# Patient Record
Sex: FEMALE | Race: BLACK OR AFRICAN AMERICAN
[De-identification: names, ages, dates, MRNs, and addresses within clinical notes are randomized per-mention and may not be internally consistent; named-entity substitution may affect disease eponyms.]

---

## 2018-03-06 ENCOUNTER — HOSPITAL ENCOUNTER (INPATIENT)
Dept: HOSPITAL 72 - EMR | Age: 68
LOS: 2 days | Discharge: HOME | DRG: 191 | End: 2018-03-08
Payer: OTHER GOVERNMENT

## 2018-03-06 VITALS — SYSTOLIC BLOOD PRESSURE: 167 MMHG | DIASTOLIC BLOOD PRESSURE: 79 MMHG

## 2018-03-06 VITALS — WEIGHT: 160 LBS | BODY MASS INDEX: 26.66 KG/M2 | HEIGHT: 65 IN

## 2018-03-06 VITALS — SYSTOLIC BLOOD PRESSURE: 175 MMHG | DIASTOLIC BLOOD PRESSURE: 79 MMHG

## 2018-03-06 VITALS — SYSTOLIC BLOOD PRESSURE: 164 MMHG | DIASTOLIC BLOOD PRESSURE: 97 MMHG

## 2018-03-06 VITALS — DIASTOLIC BLOOD PRESSURE: 79 MMHG | SYSTOLIC BLOOD PRESSURE: 149 MMHG

## 2018-03-06 DIAGNOSIS — R00.0: ICD-10-CM

## 2018-03-06 DIAGNOSIS — J44.1: Primary | ICD-10-CM

## 2018-03-06 DIAGNOSIS — E46: ICD-10-CM

## 2018-03-06 DIAGNOSIS — Z79.84: ICD-10-CM

## 2018-03-06 DIAGNOSIS — E11.9: ICD-10-CM

## 2018-03-06 DIAGNOSIS — I11.9: ICD-10-CM

## 2018-03-06 DIAGNOSIS — Z72.0: ICD-10-CM

## 2018-03-06 DIAGNOSIS — I10: ICD-10-CM

## 2018-03-06 DIAGNOSIS — I49.3: ICD-10-CM

## 2018-03-06 DIAGNOSIS — R79.89: ICD-10-CM

## 2018-03-06 LAB
ADD MANUAL DIFF: NO
ALBUMIN SERPL-MCNC: 3.3 G/DL (ref 3.4–5)
ALBUMIN/GLOB SERPL: 0.8 {RATIO} (ref 1–2.7)
ALP SERPL-CCNC: 138 U/L (ref 46–116)
ALT SERPL-CCNC: 34 U/L (ref 12–78)
ANION GAP SERPL CALC-SCNC: 10 MMOL/L (ref 5–15)
AST SERPL-CCNC: 30 U/L (ref 15–37)
BASOPHILS NFR BLD AUTO: 1 % (ref 0–2)
BILIRUB SERPL-MCNC: 0.2 MG/DL (ref 0.2–1)
BUN SERPL-MCNC: 19 MG/DL (ref 7–18)
CALCIUM SERPL-MCNC: 9.3 MG/DL (ref 8.5–10.1)
CHLORIDE SERPL-SCNC: 104 MMOL/L (ref 98–107)
CK MB SERPL-MCNC: 10.3 NG/ML (ref 0–3.6)
CK SERPL-CCNC: 427 U/L (ref 26–308)
CO2 SERPL-SCNC: 27 MMOL/L (ref 21–32)
CREAT SERPL-MCNC: 1.1 MG/DL (ref 0.55–1.3)
EOSINOPHIL NFR BLD AUTO: 11.5 % (ref 0–3)
ERYTHROCYTE [DISTWIDTH] IN BLOOD BY AUTOMATED COUNT: 14.2 % (ref 11.6–14.8)
GLOBULIN SER-MCNC: 4.2 G/DL
HCT VFR BLD CALC: 43.6 % (ref 37–47)
HGB BLD-MCNC: 14.4 G/DL (ref 12–16)
LYMPHOCYTES NFR BLD AUTO: 17.6 % (ref 20–45)
MCV RBC AUTO: 88 FL (ref 80–99)
MONOCYTES NFR BLD AUTO: 5.6 % (ref 1–10)
NEUTROPHILS NFR BLD AUTO: 64.4 % (ref 45–75)
PLATELET # BLD: 268 K/UL (ref 150–450)
POTASSIUM SERPL-SCNC: 4 MMOL/L (ref 3.5–5.1)
RBC # BLD AUTO: 4.93 M/UL (ref 4.2–5.4)
SODIUM SERPL-SCNC: 141 MMOL/L (ref 136–145)
WBC # BLD AUTO: 9.7 K/UL (ref 4.8–10.8)

## 2018-03-06 PROCEDURE — 93005 ELECTROCARDIOGRAM TRACING: CPT

## 2018-03-06 PROCEDURE — 80053 COMPREHEN METABOLIC PANEL: CPT

## 2018-03-06 PROCEDURE — 85730 THROMBOPLASTIN TIME PARTIAL: CPT

## 2018-03-06 PROCEDURE — 90732 PPSV23 VACC 2 YRS+ SUBQ/IM: CPT

## 2018-03-06 PROCEDURE — 36415 COLL VENOUS BLD VENIPUNCTURE: CPT

## 2018-03-06 PROCEDURE — 82962 GLUCOSE BLOOD TEST: CPT

## 2018-03-06 PROCEDURE — 83880 ASSAY OF NATRIURETIC PEPTIDE: CPT

## 2018-03-06 PROCEDURE — 82550 ASSAY OF CK (CPK): CPT

## 2018-03-06 PROCEDURE — 94640 AIRWAY INHALATION TREATMENT: CPT

## 2018-03-06 PROCEDURE — 93306 TTE W/DOPPLER COMPLETE: CPT

## 2018-03-06 PROCEDURE — 83735 ASSAY OF MAGNESIUM: CPT

## 2018-03-06 PROCEDURE — 87040 BLOOD CULTURE FOR BACTERIA: CPT

## 2018-03-06 PROCEDURE — 71045 X-RAY EXAM CHEST 1 VIEW: CPT

## 2018-03-06 PROCEDURE — 83690 ASSAY OF LIPASE: CPT

## 2018-03-06 PROCEDURE — 80048 BASIC METABOLIC PNL TOTAL CA: CPT

## 2018-03-06 PROCEDURE — 85025 COMPLETE CBC W/AUTO DIFF WBC: CPT

## 2018-03-06 PROCEDURE — 84484 ASSAY OF TROPONIN QUANT: CPT

## 2018-03-06 PROCEDURE — 99291 CRITICAL CARE FIRST HOUR: CPT

## 2018-03-06 PROCEDURE — 82553 CREATINE MB FRACTION: CPT

## 2018-03-06 RX ADMIN — Medication SCH MCG: at 15:03

## 2018-03-06 RX ADMIN — DOCUSATE SODIUM SCH MG: 100 CAPSULE, LIQUID FILLED ORAL at 20:30

## 2018-03-06 RX ADMIN — SODIUM CHLORIDE AND POTASSIUM CHLORIDE SCH MLS/HR: 9; 2.98 INJECTION, SOLUTION INTRAVENOUS at 23:28

## 2018-03-06 RX ADMIN — SODIUM CHLORIDE AND POTASSIUM CHLORIDE SCH MLS/HR: 9; 2.98 INJECTION, SOLUTION INTRAVENOUS at 13:56

## 2018-03-06 RX ADMIN — Medication SCH MCG: at 11:00

## 2018-03-06 RX ADMIN — Medication SCH MCG: at 23:41

## 2018-03-06 RX ADMIN — Medication SCH MCG: at 19:47

## 2018-03-06 RX ADMIN — HEPARIN SODIUM SCH UNITS: 5000 INJECTION INTRAVENOUS; SUBCUTANEOUS at 20:33

## 2018-03-06 NOTE — DIAGNOSTIC IMAGING REPORT
Indication: Dyspnea

 

Comparison:  None

 

A single view chest radiograph was obtained.

 

Findings:

 

No definite infiltrate or pulmonary vascular congestion identified.  The heart is

enlarged. The aorta is mildly enlarged consistent with atherosclerotic vascular

disease.  The bones are osteopenic.

 

Impression:

 

No acute disease

## 2018-03-06 NOTE — HISTORY & PHYSICAL
History and Physical


History & Physicial


ID # [id number]











Howie Clayton Mar 6, 2018 20:19

## 2018-03-06 NOTE — CARDIOLOGY REPORT
--------------- APPROVED REPORT --------------





EKG Measurement

Heart [id number]

ME [id number]

[id number]

[id number]

[id number]





Sinus rhythm with occasional premature ventricular complexes

Possible Left atrial enlargement

Left anterior fascicular block

Nonspecific ST abnormality

Abnormal ECG

## 2018-03-07 VITALS — DIASTOLIC BLOOD PRESSURE: 132 MMHG | SYSTOLIC BLOOD PRESSURE: 161 MMHG

## 2018-03-07 VITALS — SYSTOLIC BLOOD PRESSURE: 171 MMHG | DIASTOLIC BLOOD PRESSURE: 103 MMHG

## 2018-03-07 VITALS — DIASTOLIC BLOOD PRESSURE: 79 MMHG | SYSTOLIC BLOOD PRESSURE: 155 MMHG

## 2018-03-07 VITALS — SYSTOLIC BLOOD PRESSURE: 184 MMHG | DIASTOLIC BLOOD PRESSURE: 85 MMHG

## 2018-03-07 VITALS — DIASTOLIC BLOOD PRESSURE: 91 MMHG | SYSTOLIC BLOOD PRESSURE: 177 MMHG

## 2018-03-07 VITALS — DIASTOLIC BLOOD PRESSURE: 94 MMHG | SYSTOLIC BLOOD PRESSURE: 175 MMHG

## 2018-03-07 VITALS — SYSTOLIC BLOOD PRESSURE: 160 MMHG | DIASTOLIC BLOOD PRESSURE: 98 MMHG

## 2018-03-07 VITALS — DIASTOLIC BLOOD PRESSURE: 104 MMHG | SYSTOLIC BLOOD PRESSURE: 159 MMHG

## 2018-03-07 VITALS — DIASTOLIC BLOOD PRESSURE: 95 MMHG | SYSTOLIC BLOOD PRESSURE: 160 MMHG

## 2018-03-07 LAB
ADD MANUAL DIFF: NO
ANION GAP SERPL CALC-SCNC: 7 MMOL/L (ref 5–15)
BASOPHILS NFR BLD AUTO: 0.9 % (ref 0–2)
BUN SERPL-MCNC: 21 MG/DL (ref 7–18)
CALCIUM SERPL-MCNC: 9.2 MG/DL (ref 8.5–10.1)
CHLORIDE SERPL-SCNC: 105 MMOL/L (ref 98–107)
CO2 SERPL-SCNC: 27 MMOL/L (ref 21–32)
CREAT SERPL-MCNC: 1.1 MG/DL (ref 0.55–1.3)
EOSINOPHIL NFR BLD AUTO: 1.1 % (ref 0–3)
ERYTHROCYTE [DISTWIDTH] IN BLOOD BY AUTOMATED COUNT: 14.3 % (ref 11.6–14.8)
HCT VFR BLD CALC: 43.3 % (ref 37–47)
HGB BLD-MCNC: 14.4 G/DL (ref 12–16)
LYMPHOCYTES NFR BLD AUTO: 13.9 % (ref 20–45)
MCV RBC AUTO: 89 FL (ref 80–99)
MONOCYTES NFR BLD AUTO: 7.3 % (ref 1–10)
NEUTROPHILS NFR BLD AUTO: 76.8 % (ref 45–75)
PLATELET # BLD: 292 K/UL (ref 150–450)
POTASSIUM SERPL-SCNC: 4.4 MMOL/L (ref 3.5–5.1)
RBC # BLD AUTO: 4.87 M/UL (ref 4.2–5.4)
SODIUM SERPL-SCNC: 139 MMOL/L (ref 136–145)
WBC # BLD AUTO: 14 K/UL (ref 4.8–10.8)

## 2018-03-07 RX ADMIN — DOCUSATE SODIUM SCH MG: 100 CAPSULE, LIQUID FILLED ORAL at 21:15

## 2018-03-07 RX ADMIN — THEOPHYLLINE ANHYDROUS SCH MG: 100 CAPSULE, EXTENDED RELEASE ORAL at 21:16

## 2018-03-07 RX ADMIN — HYDRALAZINE HYDROCHLORIDE SCH MG: 25 TABLET ORAL at 17:16

## 2018-03-07 RX ADMIN — Medication SCH MCG: at 23:18

## 2018-03-07 RX ADMIN — METHYLPREDNISOLONE SODIUM SUCCINATE SCH MG: 125 INJECTION, POWDER, FOR SOLUTION INTRAMUSCULAR; INTRAVENOUS at 09:26

## 2018-03-07 RX ADMIN — Medication SCH MCG: at 11:05

## 2018-03-07 RX ADMIN — SODIUM CHLORIDE AND POTASSIUM CHLORIDE SCH MLS/HR: 9; 2.98 INJECTION, SOLUTION INTRAVENOUS at 18:39

## 2018-03-07 RX ADMIN — METHYLPREDNISOLONE SODIUM SUCCINATE SCH MG: 125 INJECTION, POWDER, FOR SOLUTION INTRAMUSCULAR; INTRAVENOUS at 17:16

## 2018-03-07 RX ADMIN — Medication SCH MCG: at 08:05

## 2018-03-07 RX ADMIN — LISINOPRIL SCH MG: 20 TABLET ORAL at 09:25

## 2018-03-07 RX ADMIN — HYDRALAZINE HYDROCHLORIDE SCH MG: 25 TABLET ORAL at 12:23

## 2018-03-07 RX ADMIN — METHYLPREDNISOLONE SODIUM SUCCINATE SCH MG: 125 INJECTION, POWDER, FOR SOLUTION INTRAMUSCULAR; INTRAVENOUS at 12:15

## 2018-03-07 RX ADMIN — HYDRALAZINE HYDROCHLORIDE SCH MG: 25 TABLET ORAL at 05:44

## 2018-03-07 RX ADMIN — Medication SCH MCG: at 04:10

## 2018-03-07 RX ADMIN — HEPARIN SODIUM SCH UNITS: 5000 INJECTION INTRAVENOUS; SUBCUTANEOUS at 13:24

## 2018-03-07 RX ADMIN — SODIUM CHLORIDE AND POTASSIUM CHLORIDE SCH MLS/HR: 9; 2.98 INJECTION, SOLUTION INTRAVENOUS at 09:30

## 2018-03-07 RX ADMIN — Medication SCH MCG: at 15:43

## 2018-03-07 RX ADMIN — DOCUSATE SODIUM SCH MG: 100 CAPSULE, LIQUID FILLED ORAL at 09:25

## 2018-03-07 RX ADMIN — Medication SCH MCG: at 19:47

## 2018-03-07 RX ADMIN — HYDRALAZINE HYDROCHLORIDE SCH MG: 25 TABLET ORAL at 23:39

## 2018-03-07 RX ADMIN — THEOPHYLLINE ANHYDROUS SCH MG: 100 CAPSULE, EXTENDED RELEASE ORAL at 09:26

## 2018-03-07 RX ADMIN — HEPARIN SODIUM SCH UNITS: 5000 INJECTION INTRAVENOUS; SUBCUTANEOUS at 05:39

## 2018-03-07 RX ADMIN — METFORMIN HYDROCHLORIDE SCH MG: 500 TABLET ORAL at 05:38

## 2018-03-07 RX ADMIN — METFORMIN HYDROCHLORIDE SCH MG: 500 TABLET ORAL at 17:15

## 2018-03-07 RX ADMIN — METHYLPREDNISOLONE SODIUM SUCCINATE SCH MG: 125 INJECTION, POWDER, FOR SOLUTION INTRAMUSCULAR; INTRAVENOUS at 23:39

## 2018-03-07 RX ADMIN — HEPARIN SODIUM SCH UNITS: 5000 INJECTION INTRAVENOUS; SUBCUTANEOUS at 21:18

## 2018-03-07 RX ADMIN — ASPIRIN 81 MG SCH MG: 81 TABLET ORAL at 09:26

## 2018-03-07 NOTE — HISTORY AND PHYSICAL REPORT
DATE OF ADMISSION:  03/06/2018



NOTE:  POOR AUDIO



REASON FOR ADMISSION:  COPD exacerbation.



HISTORY OF PRESENT ILLNESS:  The patient is a pleasant 67-year-old female

with past medical history significant for COPD, at this time presents with

exacerbation of shortness of breath and increasingly short of breath.

Denies any diarrhea or constipation.  No history of DVT.  No history of

pulmonary embolism.  No history of malignancy at this time.  _____ and

currently symptoms are improved.  Consulted Dr. Kruger, pulmonary team.



PAST MEDICAL HISTORY:  As noted above.



PAST SURGICAL HISTORY:  None reported.



REVIEW OF SYSTEMS:  CONSTITUTIONAL:  No fevers, chills, or night sweats.

SKIN:  No rashes, bumps, or itching.  HEENT:  No headache, hearing or

vision changes.  BREASTS:  No lumps, pain, or discharge.  PULMONARY:  No

cough, sputum, or shortness of breath.  GASTROINTESTINAL:  No nausea,

vomiting, or diarrhea.  GENITOURINARY:  No dysuria, frequency, or urgency.

MUSCULOSKELETAL:  No joint swelling, muscle pain, or trauma.



PHYSICAL EXAMINATION:

VITAL SIGNS:  Reviewed.

GENERAL:  No acute distress.

PULMONARY:  Decreased breath sounds.

CARDIOVASCULAR:  Regular rate.  No S3 or S4.

ABDOMEN:  Soft, nontender, and nondistended.

EXTREMITIES:  No cyanosis, swelling, or edema.



LABORATORY DATA:  WBC is 9.7, hemoglobin _____, hematocrit 44, and platelet

count 248,000.  Chemistry reviewed, BUN of 19, creatinine 1.1, glucose

120.



ASSESSMENT AND RECOMMENDATIONS:

1. Chronic obstructive pulmonary disease exacerbation.  Recommend the

patient to get oxygen as well as steroids as well as breathing treatments.

_____.  Pulmonary team consulted.

2. Tachycardia, _____ to hypertension.  Closely monitor.  Consult the

Cardiology Service.  Premature ventricular contractions noted.  The

patient is on telemetry.  Closely monitor again.

3. Azotemia.  Recommend Nephrology Service evaluation.

4. Hypoalbuminemia and malnutrition.  Closely monitor for improvement,

potentially secondary to decreased oral intake.

5. _____ secondary to reactive process.



I appreciate the consultation.









  ______________________________________________

  Howie Clayton M.D.





DR:  Janine

D:  03/06/2018 20:19

T:  03/07/2018 05:12

JOB#:  [id number]

CC:

## 2018-03-07 NOTE — CONSULTATION
History of Present Illness


General


Date patient seen:  Mar 7, 2018


Chief Complaint:  Dyspnea/Respdistress


Reason for Consultation:  dyspnea





Present Illness


HPI


67 year old female with hx of COPD, smoking presents with complaints of 

shortness of breath over the past 7 days she has been worsening


She feels increasingly short of breath. Patient has found herself using her 

albuterol machine more than usual and is any neck pain or photophobia denies 

any chest pain.


She is admitted for acute exacerbation of COPD and bronchitis.


Allergies:  


Coded Allergies:  


     No Known Allergies (Unverified , 3/6/18)





Medication History


Scheduled


Atorvastatin Calcium* (Atorvastatin Calcium*), 10 MG ORAL BEDTIME, (Reported)


Lisinopril/Hydrochlorothiazide 20-25 Mg Tab (Lisinopril-Hctz 20-25 Mg Tab), 2 

TAB ORAL DAILY, (Reported)


Metformin Hcl (Metformin Hcl), 500 MG MC BID, (Reported)





Patient History


Healthcare decision maker





Resuscitation status


Full Code


Advanced Directive on File








Past Medical/Surgical History


Past Medical/Surgical History:  


(1) COPD (chronic obstructive pulmonary disease)





Review of Systems


All Other Systems:  negative except mentioned in HPI





Physical Exam


General Appearance:  WD/WN


Lines, tubes and drains:  peripheral


HEENT:  normocephalic, atraumatic


Neck:  non-tender, normal alignment


Respiratory/Chest:  chest wall non-tender, lungs clear


Cardiovascular/Chest:  normal peripheral pulses, normal rate


Abdomen:  normal bowel sounds, non tender


Genitourinary/Rectal:  normal genital exam


Extremities:  normal range of motion


Skin Exam:  normal pigmentation


Neurologic:  CNs II-XII grossly normal





Last 24 Hour Vital Signs








  Date Time  Temp Pulse Resp B/P (MAP) Pulse Ox O2 Delivery O2 Flow Rate FiO2


 


3/7/18 07:59  94 20  98 Room Air  21


 


3/7/18 07:50  90 20  93 Room Air  21


 


3/7/18 07:30 97.9       


 


3/7/18 05:44    171/103    


 


3/7/18 04:02  95      


 


3/7/18 04:00 97.9 95 20 171/103  Nasal Cannula 2.0 





 97.9       


 


3/7/18 03:14  93 20  100 Nasal Cannula 2.0 28


 


3/7/18 03:02  98 20  100 Nasal Cannula 2.0 28


 


3/7/18 00:01  97      


 


3/6/18 23:17  103 21  99 Nasal Cannula 2.0 28


 


3/6/18 23:08  101 22  97 Nasal Cannula 2.0 28


 


3/6/18 19:50  96      


 


3/6/18 19:40  104 20  97 Room Air  21


 


3/6/18 19:30  102 20  94 Room Air  21


 


3/6/18 16:00  95      


 


3/6/18 15:10  93 20  97 Room Air  


 


3/6/18 15:05  105 23  94 Room Air  


 


3/6/18 12:50  102      


 


3/6/18 12:25 98.1 99 20 164/97 94 Nasal Cannula 2.0 28





 98.1       


 


3/6/18 12:07 98.1 89 20 167/79 93 Nasal Cannula 2.0 28





 98.1       


 


3/6/18 11:45 98.1 89 20 167/79 93 Nasal Cannula 2.0 28





 98.1       


 


3/6/18 10:55  85 20  100 Nasal Cannula 2.0 28


 


3/6/18 10:44  87 23  95 Nasal Cannula 2.0 28


 


3/6/18 09:45 98.1 89 26 149/79 93 Room Air 10.0 21





 98.1       

















Intake and Output  


 


 3/6/18 3/7/18





 19:00 07:00


 


Intake Total 120 ml 1153.3 ml


 


Balance 120 ml 1153.3 ml


 


  


 


Intake Oral 120 ml 


 


IV Total  1153.3 ml


 


# Voids 2 











Laboratory Tests








Test


  3/7/18


06:20


 


White Blood Count


  14.0 K/UL


(4.8-10.8)  H


 


Red Blood Count


  4.87 M/UL


(4.20-5.40)


 


Hemoglobin


  14.4 G/DL


(12.0-16.0)


 


Hematocrit


  43.3 %


(37.0-47.0)


 


Mean Corpuscular Volume 89 FL (80-99)  


 


Mean Corpuscular Hemoglobin


  29.6 PG


(27.0-31.0)


 


Mean Corpuscular Hemoglobin


Concent 33.3 G/DL


(32.0-36.0)


 


Red Cell Distribution Width


  14.3 %


(11.6-14.8)


 


Platelet Count


  292 K/UL


(150-450)


 


Mean Platelet Volume


  8.6 FL


(6.5-10.1)


 


Neutrophils (%) (Auto)


  76.8 %


(45.0-75.0)  H


 


Lymphocytes (%) (Auto)


  13.9 %


(20.0-45.0)  L


 


Monocytes (%) (Auto)


  7.3 %


(1.0-10.0)


 


Eosinophils (%) (Auto)


  1.1 %


(0.0-3.0)


 


Basophils (%) (Auto)


  0.9 %


(0.0-2.0)


 


Activated Partial


Thromboplast Time 20 SEC (23-33)


L


 


Sodium Level


  139 MMOL/L


(136-145)


 


Potassium Level


  4.4 MMOL/L


(3.5-5.1)


 


Chloride Level


  105 MMOL/L


()


 


Carbon Dioxide Level


  27 MMOL/L


(21-32)


 


Anion Gap


  7 mmol/L


(5-15)


 


Blood Urea Nitrogen


  21 mg/dL


(7-18)  H


 


Creatinine


  1.1 MG/DL


(0.55-1.30)


 


Estimat Glomerular Filtration


Rate > 60 mL/min


(>60)


 


Glucose Level


  88 MG/DL


()


 


Calcium Level


  9.2 MG/DL


(8.5-10.1)


 


Magnesium Level


  1.9 MG/DL


(1.8-2.4)


 


Troponin I


  0.032 ng/mL


(0.000-0.056)








Height (Feet):  5


Height (Inches):  5.00


Weight (Pounds):  160


Medications





Current Medications








 Medications


  (Trade)  Dose


 Ordered  Sig/Ben


 Route


 PRN Reason  Start Time


 Stop Time Status Last Admin


Dose Admin


 


 Acetaminophen


  (Tylenol)  650 mg  Q4H  PRN


 ORAL


 Mild Pain (Pain Scale 1-3)  3/6/18 10:15


 4/5/18 10:14  3/7/18 06:31


 


 


 Acetaminophen


  (Tylenol)  650 mg  Q4H  PRN


 ORAL


 T>100.5  3/6/18 10:15


 4/5/18 10:14   


 


 


 Albuterol/


 Ipratropium


  (Albuterol/


 Ipratropium)  3 ml  Q6H  PRN


 HHN


 Shortness of Breath  3/6/18 10:15


 3/11/18 10:14   


 


 


 Aspirin


  (ASA)  81 mg  DAILY


 ORAL


   3/7/18 09:00


 4/6/18 08:59   


 


 


 Atorvastatin


 Calcium


  (Lipitor)  10 mg  BEDTIME


 ORAL


   3/7/18 21:00


 4/6/18 20:59   


 


 


 Dextrose


  (Dextrose 50%)    STAT  PRN


 IV


 Hypoglycemia  3/6/18 10:15


 4/5/18 10:14   


 


 


 Diphenhydramine


 HCl


  (Benadryl)  25 mg  Q6H  PRN


 ORAL


 Itching/Pruritis  3/6/18 10:15


 4/5/18 10:14   


 


 


 Docusate Sodium


  (Colace)  100 mg  EVERY 12  HOURS


 ORAL


   3/6/18 21:00


 4/5/18 20:59  3/6/18 20:30


 


 


 Heparin Sodium


  (Porcine)


  (Heparin 5000


 units/ml)  5,000 units  EVERY 8  HOURS


 SUBQ


   3/6/18 22:00


 4/5/18 21:59  3/7/18 05:39


 


 


 Hydralazine HCl


  (Apresoline)  25 mg  Q6HR


 ORAL


   3/7/18 06:00


 4/6/18 05:59  3/7/18 05:44


 


 


 Hydrochlorothiazide


  (Hydrodiuril)  50 mg  DAILY


 ORAL


   3/7/18 09:00


 4/6/18 08:59   


 


 


 Ipratropium


 Bromide


  (Atrovent)  0.5 mcg  Q4HRT


 HHN


   3/6/18 11:00


 3/11/18 10:59  3/7/18 08:05


 


 


 Lisinopril


  (Prinivil)  40 mg  DAILY


 ORAL


   3/7/18 09:00


 4/6/18 08:59   


 


 


 Magnesium Sulfate  100 ml @ 


 100 mls/hr  Q1H


 IVPB


   3/7/18 09:00


 3/7/18 10:59   


 


 


 Metformin HCl


  (Glucophage)  500 mg  BID@0630,1630


 ORAL


   3/7/18 06:30


 4/6/18 06:29  3/7/18 05:38


 


 


 Nitroglycerin


  (Ntg)  0.4 mg  Q5M  PRN


 SL


 Prn Chest Pain  3/6/18 10:15


 4/5/18 10:14   


 


 


 Promethazine HCl/


 Codeine


  (Phenergan with


 Codeine)  5 ml  Q4H  PRN


 ORAL


 For Cough  3/7/18 08:45


 4/6/18 08:44 UNV  


 


 


 Sodium Chloride  1,000 ml @ 


 100 mls/hr  Q10H


 IV


   3/6/18 13:00


 4/5/18 12:59  3/6/18 23:28


 


 


 Theophylline


  (Bruno-Dur)  100 mg  EVERY 12  HOURS


 ORAL


   3/7/18 09:00


 4/6/18 08:59 UNV  


 


 


 Tramadol HCl


  (Ultram)  50 mg  Q8H  PRN


 ORAL


 Moderate to Severe Pain (4-10)  3/6/18 18:00


 3/13/18 17:59   


 


 


 Verapamil HCl


  (Calan SR)  120 mg  DAILY


 ORAL


   3/7/18 09:00


 4/6/18 08:59   


 


 


 Zolpidem Tartrate


  (Ambien)  5 mg  HSPRN  PRN


 ORAL


 Insomnia  3/6/18 21:00


 3/13/18 20:59   


 











Assessment/Plan


Problem List:  


(1) Purulent bronchitis


ICD Codes:  J41.1 - Mucopurulent chronic bronchitis


SNOMED:  08944097


(2) COPD exacerbation


ICD Codes:  J44.1 - Chronic obstructive pulmonary disease with (acute) 

exacerbation


SNOMED:  743756370


Assessment/Plan


respiratory treatment


iv abx


check sputum


titrate fio2 to sat of 92%


short course of abx











HERI GAGNON Mar 7, 2018 08:43

## 2018-03-07 NOTE — CARDIOLOGY PROGRESS NOTE
Assessment/Plan


Assessment/Plan


The patient is seen and examined, full consult note will be dictated shortly.





Objective





Last 24 Hour Vital Signs








  Date Time  Temp Pulse Resp B/P (MAP) Pulse Ox O2 Delivery O2 Flow Rate FiO2


 


3/7/18 05:44    171/103    


 


3/7/18 04:02  95      


 


3/7/18 04:00 97.9 95 20 171/103  Nasal Cannula 2.0 





 97.9       


 


3/7/18 03:14  93 20  100 Nasal Cannula 2.0 28


 


3/7/18 03:02  98 20  100 Nasal Cannula 2.0 28


 


3/7/18 00:01  97      


 


3/6/18 23:17  103 21  99 Nasal Cannula 2.0 28


 


3/6/18 23:08  101 22  97 Nasal Cannula 2.0 28


 


3/6/18 19:50  96      


 


3/6/18 19:40  104 20  97 Room Air  21


 


3/6/18 19:30  102 20  94 Room Air  21


 


3/6/18 16:00  95      


 


3/6/18 15:10  93 20  97 Room Air  


 


3/6/18 15:05  105 23  94 Room Air  


 


3/6/18 12:50  102      


 


3/6/18 12:25 98.1 99 20 164/97 94 Nasal Cannula 2.0 28





 98.1       


 


3/6/18 12:07 98.1 89 20 167/79 93 Nasal Cannula 2.0 28





 98.1       


 


3/6/18 11:45 98.1 89 20 167/79 93 Nasal Cannula 2.0 28





 98.1       


 


3/6/18 10:55  85 20  100 Nasal Cannula 2.0 28


 


3/6/18 10:44  87 23  95 Nasal Cannula 2.0 28


 


3/6/18 09:45 98.1 89 26 149/79 93 Room Air 10.0 21





 98.1       

















Intake and Output  


 


 3/6/18 3/7/18





 19:00 07:00


 


Intake Total 120 ml 1153.3 ml


 


Balance 120 ml 1153.3 ml


 


  


 


Intake Oral 120 ml 


 


IV Total  1153.3 ml


 


# Voids 2 











Laboratory Tests








Test


  3/7/18


06:20


 


White Blood Count


  14.0 K/UL


(4.8-10.8)  H


 


Red Blood Count


  4.87 M/UL


(4.20-5.40)


 


Hemoglobin


  14.4 G/DL


(12.0-16.0)


 


Hematocrit


  43.3 %


(37.0-47.0)


 


Mean Corpuscular Volume 89 FL (80-99)  


 


Mean Corpuscular Hemoglobin


  29.6 PG


(27.0-31.0)


 


Mean Corpuscular Hemoglobin


Concent 33.3 G/DL


(32.0-36.0)


 


Red Cell Distribution Width


  14.3 %


(11.6-14.8)


 


Platelet Count


  292 K/UL


(150-450)


 


Mean Platelet Volume


  8.6 FL


(6.5-10.1)


 


Neutrophils (%) (Auto)


  76.8 %


(45.0-75.0)  H


 


Lymphocytes (%) (Auto)


  13.9 %


(20.0-45.0)  L


 


Monocytes (%) (Auto)


  7.3 %


(1.0-10.0)


 


Eosinophils (%) (Auto)


  1.1 %


(0.0-3.0)


 


Basophils (%) (Auto)


  0.9 %


(0.0-2.0)


 


Activated Partial


Thromboplast Time 20 SEC (23-33)


L


 


Sodium Level


  139 MMOL/L


(136-145)


 


Potassium Level


  4.4 MMOL/L


(3.5-5.1)


 


Chloride Level


  105 MMOL/L


()


 


Carbon Dioxide Level


  27 MMOL/L


(21-32)


 


Anion Gap


  7 mmol/L


(5-15)


 


Blood Urea Nitrogen


  21 mg/dL


(7-18)  H


 


Creatinine


  1.1 MG/DL


(0.55-1.30)


 


Estimat Glomerular Filtration


Rate > 60 mL/min


(>60)


 


Glucose Level


  88 MG/DL


()


 


Calcium Level


  9.2 MG/DL


(8.5-10.1)


 


Magnesium Level


  1.9 MG/DL


(1.8-2.4)


 


Troponin I Pending  

















JONATHAN QUIJANO Mar 7, 2018 07:58

## 2018-03-07 NOTE — CONSULTATION
DATE OF CONSULTATION:  03/07/2018



CARDIOLOGY CONSULTATION



CONSULTING PHYSICIAN:  Maik Rios M.D.



REFERRING PHYSICIAN:  Yaw Oliveros M.D.



ADDITIONAL REFERRING PHYSICIAN:  Howie Clayton M.D.



HISTORY OF PRESENT ILLNESS:  The patient is a very unfortunate 67-year-old

black American, who presents to the hospital with complaints of shortness

of breath, progressively worsening in the past week, not responding to her

usual nebulizer and inhalers.



The patient has history of chronic obstructive pulmonary disease due to

long history of tobacco use.



At the time of arrival to the hospital, blood pressure was 197/115 mmHg

and heart rate was 94.  She had 12-lead electrocardiogram, on arrival to

the hospital, was sinus rhythm with no ST and T-wave abnormalities.



She showed few _____.  Left premature ventricular contractions.



She also showed evidence of LVH consistent with hypertensive heart

disease.



PAST MEDICAL HISTORY:

1. COPD.

2. Hypertension.

3. Diabetes mellitus.



MEDICATIONS:  List of medications at home include atorvastatin 10 mg p.o.

at bedtime, lisinopril and hydrochlorothiazide 20/25 mg two tablets daily,

and metformin 500 mg twice daily.



FAMILY HISTORY:  No premature coronary artery disease in first-degree

relatives.



SOCIAL HISTORY:  The patient is a heavy smoker.  Denies any alcohol or

illicit drug use.



REVIEW OF SYSTEMS:  HEENT:  Denies any headache, diplopia, or blurred

vision.  CONSTITUTIONAL:  _____   CARDIOVASCULAR:  Denies any chest pain.

She has got shortness of breath.  Denies any PND, orthopnea, leg swelling,

or palpitations.  PULMONARY:  Complaining of cough, shortness of breath,

and wheezing.  GASTROINTESTINAL:  Denies any nausea, vomiting, diarrhea,

constipation, abdominal pain, or GI bleed.  GENITOURINARY:  Denies any

hematuria, dysuria, or incontinence.  NEUROLOGIC:  Denies any motor

dysfunction, sensory deficits, or altered speech.



PHYSICAL EXAMINATION:

VITAL SIGNS:  Blood pressure was 197/115, respirations 24, pulse of 94, O2

saturation 97% on simple mask, and temperature 98.0 degrees Fahrenheit.

GENERAL:  The patient is a very unfortunate 67-year-old lady, in no

apparent respiratory distress.  Alert and oriented x4.

HEENT:  Atraumatic and normocephalic.  Anicteric.  Pupils are equal, round,

and reactive to light and accommodation.  Extraocular muscles intact.

NECK:  JVP less than 5 cm.  No carotid bruit.  Carotid upstroke is 2+

bilaterally.

CARDIOVASCULAR:  Normal S1 and S2.  Regular rate and rhythm.  No murmurs,

gallops, or rubs.  PMI is at fourth intercostal space at the midclavicular

line.

LUNGS:  Diminished breath sounds.  Bilateral rhonchi, prolonged expiratory

phase.

ABDOMEN:  Soft, nontender, and nondistended.  No hepatosplenomegaly.

Positive bowel sounds.

EXTREMITIES:  No evidence of edema, clubbing, or cyanosis.



LABORATORY FINDINGS:  WBC of 9.7, hemoglobin of 14.4, hematocrit of 43.6,

and platelet count was 268.  Sodium is 141, potassium 4.0, chloride 104,

bicarbonate 27, BUN of 19, creatinine 1.1, glucose is 120, and calcium is

9.3.  Troponin I was 0.075 and proBNP was 754.  A 12-lead

electrocardiogram, sinus rhythm at a rate of 89 with left axis deviation,

LVH with single ventricular premature complexes.  Chest x-ray showed no

acute cardiopulmonary disease.



ASSESSMENT AND PLAN:  The patient is a very pleasant 67-year-old female,

seen in Cardiology consultation at request of Dr. Clayton /Dr. Oliveros.



1. Dyspnea in this patient is likely due to acute exacerbation of

chronic obstructive pulmonary disease.  IV steroids, pulmonary toilet,

oxygen and inhalers on a regular basis.  The Pulmonary consultation is

required.  We will obtain 2D echocardiography to assess left ventricular

systolic function as well as pulmonary hypertension, RA and RV cavity

size.  Further therapeutic and diagnostic decision will be based on

results of echocardiography.

2. Ventricular premature complexes, asymptomatic.  Does not require any

treatment at this time.

3. History of accelerated hypertension.  The patient will benefit from

calcium-channel blocker.  We will consider verapamil 180 mg p.o. daily.  I

would consider avoiding beta-blockers given acute bronchoconstriction.

4. Diabetes mellitus.  Aspirin and atorvastatin recommended.



I would like to thank Dr. Oliveros and Dr. Clayton, for the courtesy of

this consultation.









  ______________________________________________

  Maik Rios M.D.





DR:  LUISANA

D:  03/07/2018 08:16

T:  03/07/2018 15:03

JOB#:  [id number]

CC:

## 2018-03-08 VITALS — SYSTOLIC BLOOD PRESSURE: 162 MMHG | DIASTOLIC BLOOD PRESSURE: 88 MMHG

## 2018-03-08 VITALS — DIASTOLIC BLOOD PRESSURE: 87 MMHG | SYSTOLIC BLOOD PRESSURE: 152 MMHG

## 2018-03-08 VITALS — SYSTOLIC BLOOD PRESSURE: 159 MMHG | DIASTOLIC BLOOD PRESSURE: 104 MMHG

## 2018-03-08 VITALS — SYSTOLIC BLOOD PRESSURE: 167 MMHG | DIASTOLIC BLOOD PRESSURE: 94 MMHG

## 2018-03-08 VITALS — DIASTOLIC BLOOD PRESSURE: 94 MMHG | SYSTOLIC BLOOD PRESSURE: 167 MMHG

## 2018-03-08 RX ADMIN — HYDRALAZINE HYDROCHLORIDE SCH MG: 25 TABLET ORAL at 06:20

## 2018-03-08 RX ADMIN — LISINOPRIL SCH MG: 20 TABLET ORAL at 09:28

## 2018-03-08 RX ADMIN — METHYLPREDNISOLONE SODIUM SUCCINATE SCH MG: 125 INJECTION, POWDER, FOR SOLUTION INTRAMUSCULAR; INTRAVENOUS at 12:53

## 2018-03-08 RX ADMIN — Medication SCH MCG: at 03:37

## 2018-03-08 RX ADMIN — Medication SCH MCG: at 10:37

## 2018-03-08 RX ADMIN — HEPARIN SODIUM SCH UNITS: 5000 INJECTION INTRAVENOUS; SUBCUTANEOUS at 06:21

## 2018-03-08 RX ADMIN — SODIUM CHLORIDE AND POTASSIUM CHLORIDE SCH MLS/HR: 9; 2.98 INJECTION, SOLUTION INTRAVENOUS at 04:47

## 2018-03-08 RX ADMIN — THEOPHYLLINE ANHYDROUS SCH MG: 100 CAPSULE, EXTENDED RELEASE ORAL at 09:28

## 2018-03-08 RX ADMIN — ASPIRIN 81 MG SCH MG: 81 TABLET ORAL at 09:28

## 2018-03-08 RX ADMIN — METHYLPREDNISOLONE SODIUM SUCCINATE SCH MG: 125 INJECTION, POWDER, FOR SOLUTION INTRAMUSCULAR; INTRAVENOUS at 06:20

## 2018-03-08 RX ADMIN — METFORMIN HYDROCHLORIDE SCH MG: 500 TABLET ORAL at 06:20

## 2018-03-08 RX ADMIN — Medication SCH MCG: at 08:41

## 2018-03-08 RX ADMIN — DOCUSATE SODIUM SCH MG: 100 CAPSULE, LIQUID FILLED ORAL at 09:28

## 2018-03-08 RX ADMIN — HYDRALAZINE HYDROCHLORIDE SCH MG: 25 TABLET ORAL at 12:52

## 2018-03-08 NOTE — DISCHARGE SUMMARY
Discharge Summary


Hospital Course


Date of Admission


Mar 6, 2018 at 07:02


Date of Discharge


Mar 8, 2018 at 15:20


Admitting Diagnosis


copd exacerbation, hypoxia


HPI


Panchito Wren is a 67 year old female who was admitted on Mar 6, 2018 at 07:02 

for Chronic Obstructive Pulmonary Disease Exacerbation


Hospital Course


[id number]





Discharge


Discharge Disposition


Patient was discharged to Home (01)


Discharge Diagnoses:  











Carolina Nick NP Mar 8, 2018 16:54

## 2018-03-08 NOTE — GENERAL PROGRESS NOTE
Assessment/Plan


Assessment/Plan


1. Chronic obstructive pulmonary disease exacerbation.  


--> Recommend the patient to get oxygen as well as steroids as well as 

breathing treatments.


--> Pulmonary team consulted.


2. Tachycardia, secondary to hypertension.  


--> Closely monitor.  Consult the Cardiology Service.  


--> Premature ventricular contractions noted.  


--> The patient is on telemetry.  Closely monitor again.


3. Azotemia.  Recommend Nephrology Service evaluation.


4. Hypoalbuminemia and malnutrition.  


--> Closely monitor for improvement, potentially secondary to decreased oral 

intake.





Subjective


Date patient seen:  Mar 7, 2018


Constitutional:  Denies: no symptoms, chills, diaphoresis, fever, malaise, 

weakness, other


HEENT:  Denies: no symptoms, eye pain, blurred vision, tearing, double vision, 

ear pain, ear discharge, nose pain, nose congestion, throat pain, throat 

swelling, mouth pain, mouth swelling, other


Cardiovascular:  Denies: no symptoms, chest pain, edema, irregular heart rate, 

lightheadedness, palpitations, syncope, other


Respiratory:  Denies: no symptoms, cough, orthopnea, shortness of breath, SOB 

with excertion, SOB at rest, sputum, stridor, wheezing, other


Gastrointestinal/Abdominal:  Denies: no symptoms, abdomen distended, abdominal 

pain, black stools, tarry stools, blood in stool, constipated, diarrhea, 

difficulty swallowing, nausea, poor appetite, poor fluid intake, rectal bleeding

, vomiting, other


Genitourinary:  Denies: no symptoms, burning, discharge, frequency, flank pain, 

hematuria, incontinence, pain, urgency, other


Neurologic/Psychiatric:  Denies: no symptoms, anxiety, depressed, emotional 

problems, headache, numbness, paresthesia, pre-existing deficit, seizure, 

tingling, tremors, weakness, other


Hematologic/Lymphatic:  Reports: anemia


Allergies:  


Coded Allergies:  


     No Known Allergies (Unverified , 3/6/18)


Subjective


On oxygen support. Hypertensive and tachycardic.





Objective





Last 24 Hour Vital Signs








  Date Time  Temp Pulse Resp B/P (MAP) Pulse Ox O2 Delivery O2 Flow Rate FiO2


 


3/8/18 00:00  80      


 


3/7/18 23:39    184/85    


 


3/7/18 23:30 97.7 86 20 184/85  Room Air 97.0 





 97.7       


 


3/7/18 23:26  82 20  100 Nasal Cannula  21


 


3/7/18 23:18  91 20  95 Nasal Cannula  21


 


3/7/18 20:00 98.0 90 20 160/98  Room Air 97.0 





 98.0       


 


3/7/18 20:00  101      


 


3/7/18 19:54  93 20  99 Nasal Cannula 2.0 28


 


3/7/18 19:47  90 20  96 Nasal Cannula 2.0 28


 


3/7/18 18:00  88  159/104    


 


3/7/18 17:16    161/132    


 


3/7/18 16:50  83  161/132    


 


3/7/18 16:00 97.5 83 19 175/94 97 Nasal Cannula 2.0 





 97.5       


 


3/7/18 15:50  88 20  100 Nasal Cannula 3.0 32


 


3/7/18 15:43  82 20  99 Nasal Cannula 3.0 32


 


3/7/18 15:11  83      


 


3/7/18 14:25  89  155/79    


 


3/7/18 12:23    177/91    


 


3/7/18 12:16 97.7 96 18 177/91 95 Nasal Cannula 2.0 





 97.7       


 


3/7/18 12:05  90      


 


3/7/18 11:13  87 20  100 Nasal Cannula 3.0 32


 


3/7/18 11:05  86 20  100 Nasal Cannula 3.0 32


 


3/7/18 09:26  81  160/95    


 


3/7/18 09:25    160/95    


 


3/7/18 08:05  102      


 


3/7/18 08:00 97.7 81 18 160/95 99 Nasal Cannula 2.0 





 97.7       


 


3/7/18 07:59  94 20  98 Room Air  21


 


3/7/18 07:51  98      


 


3/7/18 07:50  90 20  93 Room Air  21


 


3/7/18 07:30 97.9       


 


3/7/18 05:44    171/103    


 


3/7/18 04:02  95      


 


3/7/18 04:00 97.9 95 20 171/103  Nasal Cannula 2.0 





 97.9       


 


3/7/18 03:14  93 20  100 Nasal Cannula 2.0 28


 


3/7/18 03:02  98 20  100 Nasal Cannula 2.0 28

















Intake and Output  


 


 3/7/18 3/8/18





 19:00 07:00


 


Intake Total 1772 ml 500 ml


 


Balance 1772 ml 500 ml


 


  


 


Intake Oral 472 ml 


 


IV Total 1300 ml 500 ml


 


# Voids 2 








Laboratory Tests


3/7/18 06:20: 


White Blood Count 14.0H, Red Blood Count 4.87, Hemoglobin 14.4, Hematocrit 43.3

, Mean Corpuscular Volume 89, Mean Corpuscular Hemoglobin 29.6, Mean 

Corpuscular Hemoglobin Concent 33.3, Red Cell Distribution Width 14.3, Platelet 

Count 292, Mean Platelet Volume 8.6, Neutrophils (%) (Auto) 76.8H, Lymphocytes (

%) (Auto) 13.9L, Monocytes (%) (Auto) 7.3, Eosinophils (%) (Auto) 1.1, 

Basophils (%) (Auto) 0.9, Activated Partial Thromboplast Time 20L, Sodium Level 

139, Potassium Level 4.4, Chloride Level 105, Carbon Dioxide Level 27, Anion 

Gap 7, Blood Urea Nitrogen 21H, Creatinine 1.1, Estimat Glomerular Filtration 

Rate > 60, Glucose Level 88, Calcium Level 9.2, Magnesium Level 1.9, Troponin I 

0.032


Height (Feet):  5


Height (Inches):  5.00


Weight (Pounds):  160


Cardiovascular:  tachycardia


Respiratory/Chest:  decreased breath sounds


Abdomen:  non tender, soft


Edema:  trace edema











Howie Clayton Mar 8, 2018 01:13

## 2018-03-08 NOTE — PULMONOLOGY PROGRESS NOTE
Assessment/Plan


Problems:  


(1) Purulent bronchitis


(2) COPD exacerbation


Assessment/Plan


imprving


less cough


taper steroids


contine oral abx and steroids


dc home


with new meds


prescription written





Subjective


Interval Events:  less cough


Allergies:  


Coded Allergies:  


     No Known Allergies (Unverified , 3/6/18)





Objective





Last 24 Hour Vital Signs








  Date Time  Temp Pulse Resp B/P (MAP) Pulse Ox O2 Delivery O2 Flow Rate FiO2


 


3/8/18 12:52    167/94    


 


3/8/18 12:00 97.2 82 20 167/94 92 Nasal Cannula 2.0 





 97.2       


 


3/8/18 10:37  88 20  100 Nasal Cannula 2.0 28


 


3/8/18 10:32  89 20  99 Nasal Cannula 2.0 28


 


3/8/18 09:28  96  152/87    


 


3/8/18 09:28    152/87    


 


3/8/18 08:43  96 20  100 Nasal Cannula 2.0 28


 


3/8/18 08:40  94 20  99 Nasal Cannula 2.0 28


 


3/8/18 08:00 96.3 94 20 152/87 92 Nasal Cannula 2.0 





 96.3       


 


3/8/18 06:20    156/98    


 


3/8/18 04:08 97.9 88 20 159/104 97 Nasal Cannula 2.0 





 97.9       


 


3/8/18 04:00  97      


 


3/8/18 03:50  88 20  100 Nasal Cannula  21


 


3/8/18 03:37  88 20  95 Nasal Cannula  21


 


3/8/18 00:00  80      


 


3/8/18 00:00  85 20 162/88 97 Nasal Cannula 2.0 


 


3/7/18 23:39    184/85    


 


3/7/18 23:30 97.7 86 20 184/85 97 Room Air  





 97.7       


 


3/7/18 23:26  82 20  100 Nasal Cannula  21


 


3/7/18 23:18  91 20  95 Nasal Cannula  21


 


3/7/18 20:00 98.0 90 20 160/98 97 Room Air  





 98.0       


 


3/7/18 20:00  101      


 


3/7/18 19:54  93 20  99 Nasal Cannula 2.0 28


 


3/7/18 19:47  90 20  96 Nasal Cannula 2.0 28


 


3/7/18 18:00  88  159/104    


 


3/7/18 17:16    161/132    


 


3/7/18 16:50  83  161/132    


 


3/7/18 16:00 97.5 83 19 175/94 97 Nasal Cannula 2.0 





 97.5       


 


3/7/18 15:50  88 20  100 Nasal Cannula 3.0 32


 


3/7/18 15:43  82 20  99 Nasal Cannula 3.0 32


 


3/7/18 15:11  83      


 


3/7/18 14:25  89  155/79    

















Intake and Output  


 


 3/7/18 3/8/18





 19:00 07:00


 


Intake Total 1772 ml 1200 ml


 


Balance 1772 ml 1200 ml


 


  


 


Intake Oral 472 ml 


 


IV Total 1300 ml 1200 ml


 


# Voids 2 








Objective


General Appearance:  WD/WN


Lines, tubes and drains:  peripheral


HEENT:  normocephalic, atraumatic


Neck:  non-tender, normal alignment


Respiratory/Chest:  chest wall non-tender, lungs clear


Cardiovascular/Chest:  normal peripheral pulses, normal rate


Abdomen:  normal bowel sounds, non tender


Genitourinary/Rectal:  normal genital exam, normal rectal exam


Extremities:  normal range of motion, non-tender


Skin Exam:  normal pigmentation


Neurologic:  CNs II-XII grossly normal





Microbiology








 Date/Time


Source Procedure


Growth Status


 


 


 3/6/18 06:05


Blood Blood Culture - Preliminary


NO GROWTH AFTER 24 HOURS Resulted


 


 3/6/18 05:50


Blood Blood Culture - Preliminary


NO GROWTH AFTER 24 HOURS Resulted








Laboratory Tests


3/8/18 06:35: Troponin I 0.021





Current Medications








 Medications


  (Trade)  Dose


 Ordered  Sig/Ben


 Route


 PRN Reason  Start Time


 Stop Time Status Last Admin


Dose Admin


 


 Acetaminophen


  (Tylenol)  650 mg  Q4H  PRN


 ORAL


 Mild Pain (Pain Scale 1-3)  3/6/18 10:15


 4/5/18 10:14  3/7/18 06:31


 


 


 Acetaminophen


  (Tylenol)  650 mg  Q4H  PRN


 ORAL


 T>100.5  3/6/18 10:15


 4/5/18 10:14   


 


 


 Albuterol/


 Ipratropium


  (Albuterol/


 Ipratropium)  3 ml  Q6H  PRN


 HHN


 Shortness of Breath  3/6/18 10:15


 3/11/18 10:14   


 


 


 Aspirin


  (ASA)  81 mg  DAILY


 ORAL


   3/7/18 09:00


 4/6/18 08:59  3/8/18 09:28


 


 


 Atorvastatin


 Calcium


  (Lipitor)  10 mg  BEDTIME


 ORAL


   3/7/18 21:00


 4/6/18 20:59  3/7/18 21:15


 


 


 Dextrose


  (Dextrose 50%)    STAT  PRN


 IV


 Hypoglycemia  3/6/18 10:15


 4/5/18 10:14   


 


 


 Diphenhydramine


 HCl


  (Benadryl)  25 mg  Q6H  PRN


 ORAL


 Itching/Pruritis  3/6/18 10:15


 4/5/18 10:14  3/7/18 13:26


 


 


 Docusate Sodium


  (Colace)  100 mg  EVERY 12  HOURS


 ORAL


   3/6/18 21:00


 4/5/18 20:59  3/8/18 09:28


 


 


 Heparin Sodium


  (Porcine)


  (Heparin 5000


 units/ml)  5,000 units  EVERY 8  HOURS


 SUBQ


   3/6/18 22:00


 4/5/18 21:59  3/8/18 06:21


 


 


 Hydralazine HCl


  (Apresoline)  25 mg  Q6HR


 ORAL


   3/7/18 06:00


 4/6/18 05:59  3/8/18 12:52


 


 


 Hydrochlorothiazide


  (Hydrodiuril)  50 mg  DAILY


 ORAL


   3/7/18 09:00


 4/6/18 08:59  3/8/18 09:28


 


 


 Ipratropium


 Bromide


  (Atrovent)  0.5 mcg  Q4HRT


 HHN


   3/6/18 11:00


 3/11/18 10:59  3/8/18 10:37


 


 


 Levofloxacin  100 ml @ 


 100 mls/hr  Q24H


 IVPB


   3/7/18 10:00


 3/14/18 09:59  3/8/18 09:29


 


 


 Lisinopril


  (Prinivil)  40 mg  DAILY


 ORAL


   3/7/18 09:00


 4/6/18 08:59  3/8/18 09:28


 


 


 Metformin HCl


  (Glucophage)  500 mg  BID@0630,1630


 ORAL


   3/7/18 06:30


 4/6/18 06:29  3/8/18 06:20


 


 


 Methylprednisolone


 Sodium Succinate


  (Solu-MEDROL)  60 mg  EVERY 6  HOURS


 IVP


   3/7/18 08:45


 4/6/18 08:44  3/8/18 12:53


 


 


 Nitroglycerin


  (Ntg)  0.4 mg  Q5M  PRN


 SL


 Prn Chest Pain  3/6/18 10:15


 4/5/18 10:14   


 


 


 Promethazine HCl/


 Codeine


  (Phenergan with


 Codeine)  5 ml  Q4H  PRN


 ORAL


 For Cough  3/7/18 08:45


 4/6/18 08:44   


 


 


 Sodium Chloride  1,000 ml @ 


 100 mls/hr  Q10H


 IV


   3/6/18 13:00


 4/5/18 12:59  3/8/18 04:47


 


 


 Theophylline


  (Bruno-Dur)  100 mg  EVERY 12  HOURS


 ORAL


   3/7/18 09:00


 4/6/18 08:59  3/8/18 09:28


 


 


 Tramadol HCl


  (Ultram)  50 mg  Q8H  PRN


 ORAL


 Moderate to Severe Pain (4-10)  3/6/18 18:00


 3/13/18 17:59   


 


 


 Verapamil HCl


  (Calan SR)  120 mg  DAILY


 ORAL


   3/7/18 09:00


 4/6/18 08:59  3/8/18 09:28


 


 


 Zolpidem Tartrate


  (Ambien)  5 mg  HSPRN  PRN


 ORAL


 Insomnia  3/6/18 21:00


 3/13/18 20:59  3/7/18 21:15


 

















HERI GAGNON Mar 8, 2018 13:16

## 2018-03-09 NOTE — DISCHARGE SUMMARY 2 SIG
DATE OF ADMISSION:  03/06/2018



DATE OF DISCHARGE:  03/08/2018



ATTENDING DOCTOR:  Howie Clayton M.D.



CONSULTANTS:

1. Hallie Kruger M.D.

2. Maik Rios M.D.



BRIEF HOSPITAL COURSE:  The patient is a 67-year-old female with medical

history significant for chronic obstructive pulmonary disease, presented

to ED complaining of shortness of breath over the past seven days that has

been worsening.  On evaluation at ED, she was given breathing treatments

and was started on IV steroids.  Blood work did not show any leukocytosis.

Troponin was negative.  She had a chest x-ray done that showed no acute

cardiopulmonary disease.  EKG was in normal sinus rhythm.  She was

admitted for chronic obstructive pulmonary disease exacerbation.  She was

initially tachycardic and had high blood pressure.  She was given O2 and

breathing treatments.  She was started on Solu-Medrol and levofloxacin.

Blood culture did not isolate any growth.  She had elevated blood pressure

and was given lisinopril.  Avoid beta-blocker, given acute

bronchoconstriction.  The patient had diabetes mellitus and was continued

on Glucophage.  She was given Lipitor and aspirin.  Troponins were negative.  

She was saturating well.  Steroids were tapered.  She was eventually discharged

home to continue oral antibiotics and steroids.



FINAL DIAGNOSES:

1. Acute chronic obstructive pulmonary disease exacerbation.

2. Purulent bronchitis.



DISPOSITION:  The patient was discharged home.



DISCHARGE MEDICATIONS:  Refer to medication list.



DISCHARGE INSTRUCTIONS:  Follow up with PMD in a week.







  ______________________________________________

  Hallie Kruger M.D.



I have been assigned to dictate discharge summary on this account and I

was not involved in the patient's management.



  ______________________________________________

  Carolina Nick N.P.





DR:  MATTHEW

D:  03/08/2018 16:54

T:  03/09/2018 03:43

JOB#:  [id number]

CC:



FERMIN

## 2018-03-09 NOTE — GENERAL PROGRESS NOTE
Assessment/Plan


Status:  stable


Assessment/Plan


1. Chronic obstructive pulmonary disease exacerbation.  


--> Recommend the patient to get oxygen as well as steroids as well as 

breathing treatments.


--> Pulmonary team consulted.


--> Improved. 


2. Tachycardia, secondary to hypertension.  


--> Improved. 


--> Closely monitor.  Consult the Cardiology Service.  


--> Premature ventricular contractions noted.  


--> The patient is on telemetry.  Closely monitor again.


3. Azotemia.  Recommend Nephrology Service evaluation.


4. Hypoalbuminemia and malnutrition.  


--> Closely monitor for improvement, potentially secondary to decreased oral 

intake.





Subjective


Date patient seen:  Mar 8, 2018


Constitutional:  Denies: no symptoms, chills, diaphoresis, fever, malaise, 

weakness, other


HEENT:  Denies: no symptoms, eye pain, blurred vision, tearing, double vision, 

ear pain, ear discharge, nose pain, nose congestion, throat pain, throat 

swelling, mouth pain, mouth swelling, other


Cardiovascular:  Denies: no symptoms, chest pain, edema, irregular heart rate, 

lightheadedness, palpitations, syncope, other


Respiratory:  Denies: no symptoms, cough, orthopnea, shortness of breath, SOB 

with excertion, SOB at rest, sputum, stridor, wheezing, other


Gastrointestinal/Abdominal:  Denies: no symptoms, abdomen distended, abdominal 

pain, black stools, tarry stools, blood in stool, constipated, diarrhea, 

difficulty swallowing, nausea, poor appetite, poor fluid intake, rectal bleeding

, vomiting, other


Genitourinary:  Denies: no symptoms, burning, discharge, frequency, flank pain, 

hematuria, incontinence, pain, urgency, other


Neurologic/Psychiatric:  Denies: no symptoms, anxiety, depressed, emotional 

problems, headache, numbness, paresthesia, pre-existing deficit, seizure, 

tingling, tremors, weakness, other


Allergies:  


Coded Allergies:  


     No Known Allergies (Unverified , 3/6/18)


Subjective


On oxygen support. BP and HR improved today. NAD.





Objective





Last 24 Hour Vital Signs








  Date Time  Temp Pulse Resp B/P (MAP) Pulse Ox O2 Delivery O2 Flow Rate FiO2


 


3/8/18 12:52    167/94    


 


3/8/18 12:00  83      


 


3/8/18 12:00 97.2 82 20 167/94 92 Nasal Cannula 2.0 





 97.2       


 


3/8/18 10:37  88 20  100 Nasal Cannula 2.0 28


 


3/8/18 10:32  89 20  99 Nasal Cannula 2.0 28


 


3/8/18 09:28  96  152/87    


 


3/8/18 09:28    152/87    

















Intake and Output  


 


 3/8/18 3/9/18





 19:00 07:00


 


Intake Total 650 ml 


 


Balance 650 ml 


 


  


 


Intake Oral 650 ml 


 


# Voids 2 








Height (Feet):  5


Height (Inches):  5.00


Weight (Pounds):  160


General Appearance:  no apparent distress


Respiratory/Chest:  decreased breath sounds


Abdomen:  non tender, soft











Howie Clayton Mar 9, 2018 09:11

## 2018-03-13 NOTE — CARDIOLOGY REPORT
--------------- APPROVED REPORT --------------





EXAM: Two-dimensional and M-mode echocardiogram with Doppler and color 

Doppler.



INDICATION

SOB



M-Mode DIMENSIONS 

IVSd0.9 (0.7-1.1cm)Left Atrium (MM)3.9 (1.6-4.0cm)

LVDd5.0 (3.5-5.6cm)Aortic Root2.6 (2.0-3.7cm)

PWd1.3 (0.7-1.1cm)Aortic Cusp Exc.1.7 (1.5-2.0cm)



LVDs3.2 (2.5-4.0cm)

PWs1.6 cm





Normal left ventricular chamber size, systolic function and wall motion.

Left ventricular ejection fraction estimated to be  65 %.

Mild left ventricular hypertrophy by 2-D.

Anterior Echo-free space, may be due to pericardial fat or effusion.

All other cardiac chamber sizes are within normal limits. 

Focal aortic valve sclerosis with adequate cusp excursion.

Thickened mitral valve leaflets with normal excursion.

Mitral annulus and aortic root calcification.

Pulmonic valve not well visualized.

Normal tricuspid valve structure. 

IVC at normal size with physiologic collapse.



A  color flow and spectral Doppler study was performed and revealed:

Trace aortic regurgitation.

Mild mitral regurgitation.

Mitral diastolic velocities suggest reduced left ventricular relaxation c/w mild LV 

diastolic dysfunction (Grade I ). 

Mild  tricuspid regurgitation.

Tricuspid  systolic velocities suggests peak right ventricular systolic pressure of  52   

mmHg, consistent with  moderate pulmonary hypertension.

Mild pulmonic regurgitation present.